# Patient Record
Sex: FEMALE | Race: WHITE | Employment: FULL TIME | URBAN - METROPOLITAN AREA
[De-identification: names, ages, dates, MRNs, and addresses within clinical notes are randomized per-mention and may not be internally consistent; named-entity substitution may affect disease eponyms.]

---

## 2018-10-30 ENCOUNTER — APPOINTMENT (OUTPATIENT)
Dept: OTHER | Facility: HOSPITAL | Age: 33
End: 2018-10-30
Attending: PREVENTIVE MEDICINE

## 2019-08-26 ENCOUNTER — OFFICE VISIT (OUTPATIENT)
Dept: RHEUMATOLOGY | Facility: CLINIC | Age: 34
End: 2019-08-26
Payer: COMMERCIAL

## 2019-08-26 ENCOUNTER — APPOINTMENT (OUTPATIENT)
Dept: LAB | Age: 34
End: 2019-08-26
Attending: INTERNAL MEDICINE
Payer: COMMERCIAL

## 2019-08-26 VITALS
DIASTOLIC BLOOD PRESSURE: 87 MMHG | TEMPERATURE: 98 F | WEIGHT: 236 LBS | SYSTOLIC BLOOD PRESSURE: 130 MMHG | BODY MASS INDEX: 41 KG/M2 | HEART RATE: 98 BPM | RESPIRATION RATE: 16 BRPM

## 2019-08-26 DIAGNOSIS — M25.522 BILATERAL ELBOW JOINT PAIN: ICD-10-CM

## 2019-08-26 DIAGNOSIS — Z87.59 HISTORY OF MISCARRIAGE: ICD-10-CM

## 2019-08-26 DIAGNOSIS — M25.50 POLYARTHRALGIA: Primary | ICD-10-CM

## 2019-08-26 DIAGNOSIS — M25.532 BILATERAL WRIST PAIN: ICD-10-CM

## 2019-08-26 DIAGNOSIS — Z87.39 HISTORY OF RHEUMATOID ARTHRITIS: ICD-10-CM

## 2019-08-26 DIAGNOSIS — R76.0 ANTI-CARDIOLIPIN ANTIBODY POSITIVE: ICD-10-CM

## 2019-08-26 DIAGNOSIS — M25.521 BILATERAL ELBOW JOINT PAIN: ICD-10-CM

## 2019-08-26 DIAGNOSIS — M25.531 BILATERAL WRIST PAIN: ICD-10-CM

## 2019-08-26 DIAGNOSIS — Z3A.13 13 WEEKS GESTATION OF PREGNANCY: ICD-10-CM

## 2019-08-26 LAB
CRP SERPL-MCNC: 1.46 MG/DL (ref ?–0.3)
SED RATE-ML: 20 MM/HR (ref 0–25)

## 2019-08-26 PROCEDURE — 99204 OFFICE O/P NEW MOD 45 MIN: CPT | Performed by: INTERNAL MEDICINE

## 2019-08-26 PROCEDURE — 85610 PROTHROMBIN TIME: CPT

## 2019-08-26 PROCEDURE — 85613 RUSSELL VIPER VENOM DILUTED: CPT

## 2019-08-26 PROCEDURE — 85705 THROMBOPLASTIN INHIBITION: CPT

## 2019-08-26 PROCEDURE — 85732 THROMBOPLASTIN TIME PARTIAL: CPT

## 2019-08-26 RX ORDER — HYDROXYCHLOROQUINE SULFATE 200 MG/1
TABLET, FILM COATED ORAL
COMMUNITY
Start: 2014-05-14 | End: 2019-08-26

## 2019-08-26 NOTE — PROGRESS NOTES
?  RHEUMATOLOGY NEW PATIENT   Date of visit: 8/26/2019  ? Patient presents with:  Establish Care: NP self ref to establish care with new rheum. Pt states 'was diagnosed with RA ten years ago.  Saw Dr. Solomon Led, was told everything is negative so is switching r checking a lupus anticoagulant and inflammatory markers. We will send away for [facility] reference laboratory panel, which tests for CASEY by ROSA and immunoflourescence.  This laboratory will then reflexively test for dsDNA and confirm by slava zabala i Future  -     [facility] SEND OUT TEST KIT; Future    Anti-cardiolipin antibody positive  -     LUPUS ANTICOAGULANT COMP; Future  -     [facility] SEND OUT TEST KIT; Future        Return in about 2 months (around 10/26/2019). ?   HPI   Burnis Heading is a 29year old tried tylenol which does not really help. Was given oral prednisone in May by her PCP which significant improved her symptoms. Does have some swelling of her wrists and her elbows but not as severe as previously.    Hx of anemia in the past, thought sheba Father    • Other (Other) Father         MS   • Arthritis Mother    • Cancer Mother      Social History:  Social History    Tobacco Use      Smoking status: Light Tobacco Smoker      Smokeless tobacco: Never Used    Alcohol use: Yes      Frequency: Monthly ?  Current Weight Height BMI BSA Pain   Wt Readings from Last 1 Encounters:  08/26/19 : 236 lb     Body mass index is 40.51 kg/m². Body surface area is 2.1 meters squared.  Pain Score: 6 - (Moderate)       Physical Exam   Constitutional: She is oriented t behavior is normal.   Nursing note and vitals reviewed.     ?  Radiology review:    nothing pertinent to review     Labs:  Lab Results   Component Value Date    WBC 9.06 05/10/2019    RBC 4.81 05/10/2019    HGB 12.2 05/10/2019    HCT 39.2 05/10/2019    PLT

## 2019-08-30 LAB
APTT PPP: 40 SECONDS (ref 25.4–36.1)
DRVVT LUPUS ANTICOAGULANT: NEGATIVE
DRVVT SCREEN RATIO: 1.06 (ref 0–1.29)
PT(LUPUS): 13.1 SECONDS (ref 12.5–14.7)
STACLOT LA DELTA: 13 SECONDS (ref ?–8)
STACLOT LA: POSITIVE

## 2019-09-03 ENCOUNTER — TELEPHONE (OUTPATIENT)
Dept: RHEUMATOLOGY | Facility: CLINIC | Age: 34
End: 2019-09-03

## 2019-09-03 DIAGNOSIS — R76.0 LUPUS ANTICOAGULANT POSITIVE: Primary | ICD-10-CM

## 2019-09-03 DIAGNOSIS — R76.0 ANTI-CARDIOLIPIN ANTIBODY POSITIVE: ICD-10-CM

## 2019-09-03 DIAGNOSIS — M25.50 POLYARTHRALGIA: ICD-10-CM

## 2019-09-03 NOTE — TELEPHONE ENCOUNTER
LVM for pt to call back to discuss results. Leonidas Severance, DO  [facility]  9/3/2019      Patient returned my call. Discussed test results at length.   CRP is borderline elevated, may be due to her pregnant status, sed rate was normal.  Discussed a

## 2019-09-16 ENCOUNTER — TELEPHONE (OUTPATIENT)
Dept: RHEUMATOLOGY | Facility: CLINIC | Age: 34
End: 2019-09-16

## 2019-09-16 DIAGNOSIS — M25.532 BILATERAL WRIST PAIN: ICD-10-CM

## 2019-09-16 DIAGNOSIS — M25.521 BILATERAL ELBOW JOINT PAIN: ICD-10-CM

## 2019-09-16 DIAGNOSIS — G89.29 CHRONIC PAIN OF BOTH KNEES: ICD-10-CM

## 2019-09-16 DIAGNOSIS — M25.561 CHRONIC PAIN OF BOTH KNEES: ICD-10-CM

## 2019-09-16 DIAGNOSIS — M25.522 BILATERAL ELBOW JOINT PAIN: ICD-10-CM

## 2019-09-16 DIAGNOSIS — M06.9 RHEUMATOID ARTHRITIS INVOLVING MULTIPLE SITES, UNSPECIFIED RHEUMATOID FACTOR PRESENCE: Primary | ICD-10-CM

## 2019-09-16 DIAGNOSIS — M25.562 CHRONIC PAIN OF BOTH KNEES: ICD-10-CM

## 2019-09-16 DIAGNOSIS — M25.531 BILATERAL WRIST PAIN: ICD-10-CM

## 2019-09-16 NOTE — TELEPHONE ENCOUNTER
LVM for pt to call back to discuss symptoms. Rhiannon Tobar DO  [facility]  9/16/2019    Return patient's call. Patient states that she has had worsened joint pain and swelling particularly of her elbows, knees, ankles bilaterally.   States this

## 2019-09-16 NOTE — TELEPHONE ENCOUNTER
Pt called. , pt states 'joint pain has gotten worse, elbow hurting, would like to know if can have steroids as had discussed with Dr. Laina Vera    Pt [phone]

## 2019-09-19 RX ORDER — PREDNISONE 1 MG/1
TABLET ORAL
Qty: 90 TABLET | Refills: 0 | Status: SHIPPED | OUTPATIENT
Start: 2019-09-19 | End: 2020-08-31 | Stop reason: ALTCHOICE

## 2020-09-08 PROBLEM — G56.02 CARPAL TUNNEL SYNDROME OF LEFT WRIST: Status: ACTIVE | Noted: 2020-09-08

## 2020-12-27 ENCOUNTER — TELEPHONE (OUTPATIENT)
Dept: INTERNAL MEDICINE CLINIC | Facility: HOSPITAL | Age: 35
End: 2020-12-27

## 2020-12-27 DIAGNOSIS — Z20.822 SUSPECTED 2019 NOVEL CORONAVIRUS INFECTION: Primary | ICD-10-CM

## 2020-12-28 ENCOUNTER — TELEPHONE (OUTPATIENT)
Dept: INTERNAL MEDICINE CLINIC | Facility: HOSPITAL | Age: 35
End: 2020-12-28

## 2020-12-29 ENCOUNTER — LAB ENCOUNTER (OUTPATIENT)
Dept: LAB | Age: 35
End: 2020-12-29
Attending: PREVENTIVE MEDICINE

## 2020-12-29 DIAGNOSIS — Z20.822 SUSPECTED 2019 NOVEL CORONAVIRUS INFECTION: ICD-10-CM

## 2020-12-30 LAB — SARS-COV-2 RNA RESP QL NAA+PROBE: DETECTED

## 2020-12-30 NOTE — TELEPHONE ENCOUNTER
Results and RTW guidelines:    COVID RESULT GIVEN:      Test type:    [] Rapid         [x]       [] NEGATIVE      [x] Positive   - Monitor sx and temperature    - Employee should quarantine at home for at least 10 days from sx onset, follow the CDC gu

## 2020-12-30 NOTE — PROGRESS NOTES
Employee given positive result. See telephone note.   Also given employee Covid positive tips via [employer]

## 2021-04-09 DIAGNOSIS — Z23 NEED FOR VACCINATION: ICD-10-CM

## 2021-10-02 ENCOUNTER — LAB ENCOUNTER (OUTPATIENT)
Dept: LAB | Age: 36
End: 2021-10-02
Attending: FAMILY MEDICINE
Payer: COMMERCIAL

## 2021-10-02 DIAGNOSIS — Z13.29 SCREENING FOR THYROID DISORDER: ICD-10-CM

## 2021-10-02 DIAGNOSIS — Z01.84 IMMUNITY STATUS TESTING: ICD-10-CM

## 2021-10-02 DIAGNOSIS — Z13.228 ENCOUNTER FOR SCREENING FOR METABOLIC DISORDER: ICD-10-CM

## 2021-10-02 DIAGNOSIS — E55.9 VITAMIN D DEFICIENCY: ICD-10-CM

## 2021-10-02 DIAGNOSIS — Z13.220 ENCOUNTER FOR SCREENING FOR LIPID DISORDER: ICD-10-CM

## 2021-10-02 DIAGNOSIS — Z13.0 SCREENING FOR DISORDER OF BLOOD AND BLOOD-FORMING ORGANS: ICD-10-CM

## 2021-10-02 DIAGNOSIS — Z13.29 ENCOUNTER FOR SCREENING FOR ENDOCRINE DISORDER: ICD-10-CM

## 2021-10-02 PROCEDURE — 36415 COLL VENOUS BLD VENIPUNCTURE: CPT

## 2021-10-02 PROCEDURE — 82306 VITAMIN D 25 HYDROXY: CPT

## 2021-10-02 PROCEDURE — 86787 VARICELLA-ZOSTER ANTIBODY: CPT

## 2021-10-02 PROCEDURE — 86765 RUBEOLA ANTIBODY: CPT

## 2021-10-02 PROCEDURE — 85027 COMPLETE CBC AUTOMATED: CPT

## 2021-10-02 PROCEDURE — 86706 HEP B SURFACE ANTIBODY: CPT

## 2021-10-02 PROCEDURE — 86762 RUBELLA ANTIBODY: CPT

## 2021-10-02 PROCEDURE — 80061 LIPID PANEL: CPT

## 2021-10-02 PROCEDURE — 83036 HEMOGLOBIN GLYCOSYLATED A1C: CPT

## 2021-10-02 PROCEDURE — 84443 ASSAY THYROID STIM HORMONE: CPT

## 2021-10-02 PROCEDURE — 80053 COMPREHEN METABOLIC PANEL: CPT

## 2021-10-02 PROCEDURE — 86735 MUMPS ANTIBODY: CPT

## 2021-10-04 NOTE — PROGRESS NOTES
Monique,    Labs overall are stable. Make sure to eat well, exercise, healthy food choices. Always work on exercise. Our immunity panels show that you're immune to everything. The rest of your labs are also normal except your Vitamin D is low.  I have pr